# Patient Record
Sex: FEMALE | Race: WHITE | NOT HISPANIC OR LATINO | Employment: FULL TIME | URBAN - NONMETROPOLITAN AREA
[De-identification: names, ages, dates, MRNs, and addresses within clinical notes are randomized per-mention and may not be internally consistent; named-entity substitution may affect disease eponyms.]

---

## 2018-01-12 ENCOUNTER — OFFICE VISIT (OUTPATIENT)
Dept: URGENT CARE | Facility: PHYSICIAN GROUP | Age: 53
End: 2018-01-12

## 2018-01-12 VITALS
BODY MASS INDEX: 22.82 KG/M2 | OXYGEN SATURATION: 97 % | HEIGHT: 66 IN | DIASTOLIC BLOOD PRESSURE: 80 MMHG | SYSTOLIC BLOOD PRESSURE: 138 MMHG | TEMPERATURE: 99.1 F | WEIGHT: 142 LBS | HEART RATE: 63 BPM | RESPIRATION RATE: 16 BRPM

## 2018-01-12 DIAGNOSIS — R23.8 VESICULAR RASH: ICD-10-CM

## 2018-01-12 PROCEDURE — 99203 OFFICE O/P NEW LOW 30 MIN: CPT | Performed by: PHYSICIAN ASSISTANT

## 2018-01-12 RX ORDER — LOVASTATIN 20 MG/1
10 TABLET ORAL NIGHTLY
COMMUNITY
End: 2019-12-13 | Stop reason: SDUPTHER

## 2018-01-12 RX ORDER — LEVOTHYROXINE SODIUM 0.1 MG/1
100 TABLET ORAL
COMMUNITY
End: 2018-12-21 | Stop reason: SDUPTHER

## 2018-01-12 RX ORDER — LOSARTAN POTASSIUM 50 MG/1
50 TABLET ORAL DAILY
COMMUNITY
End: 2018-12-21 | Stop reason: SDUPTHER

## 2018-01-12 RX ORDER — VALACYCLOVIR HYDROCHLORIDE 1 G/1
1000 TABLET, FILM COATED ORAL 3 TIMES DAILY
Qty: 21 TAB | Refills: 1 | Status: SHIPPED | OUTPATIENT
Start: 2018-01-12 | End: 2018-01-19

## 2018-01-12 RX ORDER — CHLORAL HYDRATE 500 MG
1000 CAPSULE ORAL
COMMUNITY

## 2018-01-12 ASSESSMENT — ENCOUNTER SYMPTOMS
CHILLS: 0
MYALGIAS: 0
FEVER: 0

## 2018-01-12 NOTE — PROGRESS NOTES
Subjective:      Susi Chatman is a 52 y.o. female who presents with Knee Pain (right knee sore x30 years, blisters, redness sore)            Vesicular rash of the right knee region for the last 10 days or so. History of similar episodes in the past over the last 30 years. Reports the rash is somewhat painful and irritating at this time. No clear diagnosis in the past.      Rash   This is a recurrent problem. The current episode started 1 to 4 weeks ago. The problem has been waxing and waning since onset. Location: right leg just above the knee. The rash is characterized by blistering, pain and redness. She was exposed to nothing. Pertinent negatives include no fever. Past treatments include nothing. The treatment provided no relief.       Review of Systems   Constitutional: Negative for chills and fever.   Musculoskeletal: Negative for myalgias.   Skin: Positive for rash. Negative for itching.     Allergies:Aspirin    Current Outpatient Prescriptions Ordered in [facility]   Medication Sig Dispense Refill   • levothyroxine (SYNTHROID) 100 MCG Tab Take 100 mcg by mouth Every morning on an empty stomach.     • losartan (COZAAR) 50 MG Tab Take 50 mg by mouth every day.     • lovastatin (MEVACOR) 20 MG Tab Take 20 mg by mouth every evening.     • Omega-3 Fatty Acids (FISH OIL) 1000 MG Cap capsule Take 1,000 mg by mouth 3 times a day, with meals.     • Red Yeast Rice Extract (RED YEAST RICE PO) Take  by mouth.     • vitamin D (CHOLECALCIFEROL) 1000 UNIT Tab Take 2,000 Units by mouth every day.     • B Complex-C (SUPER B COMPLEX PO) Take  by mouth.     • valacyclovir (VALTREX) 1 GM Tab Take 1 Tab by mouth 3 times a day for 7 days. 21 Tab 1     No current Epic-ordered facility-administered medications on file.        History reviewed. No pertinent past medical history.    Social History   Substance Use Topics   • Smoking status: Never Smoker   • Smokeless tobacco: Never Used   • Alcohol use Yes      Comment: rare  "      No family status information on file.   History reviewed. No pertinent family history.       Objective:     /80   Pulse 63   Temp 37.3 °C (99.1 °F)   Resp 16   Ht 1.676 m (5' 6\")   Wt 64.4 kg (142 lb)   SpO2 97%   BMI 22.92 kg/m²      Physical Exam   Constitutional: She is oriented to person, place, and time. She appears well-developed and well-nourished. No distress.   HENT:   Head: Normocephalic and atraumatic.   Eyes: Right eye exhibits no discharge. Left eye exhibits no discharge.   Neck: Normal range of motion. Neck supple.   Cardiovascular: Normal rate.    Pulmonary/Chest: Effort normal.   Neurological: She is alert and oriented to person, place, and time.   Skin: Skin is warm and dry. She is not diaphoretic.   Erythematous, vesicular rash of the right upper leg just above the knee   Psychiatric: She has a normal mood and affect. Her behavior is normal. Judgment and thought content normal.   Nursing note and vitals reviewed.              Assessment/Plan:     1. Vesicular rash  valacyclovir (VALTREX) 1 GM Tab    Recurrent problem over the last 30 years. Recent episode started 10 days ago. Trial of valacyclovir. Follow-up with PCP       [employer] Patient Education given: Rash, shingles    Please note that this dictation was created using voice recognition software. I have made every reasonable attempt to correct obvious errors, but I expect that there are errors of grammar and possibly content that I did not discover before finalizing the note.    "

## 2018-01-12 NOTE — PATIENT INSTRUCTIONS
Rash  A rash is a change in the color or texture of the skin. There are many different types of rashes. You may have other problems that accompany your rash.  CAUSES   · Infections.  · Allergic reactions. This can include allergies to pets or foods.  · Certain medicines.  · Exposure to certain chemicals, soaps, or cosmetics.  · Heat.  · Exposure to poisonous plants.  · Tumors, both cancerous and noncancerous.  SYMPTOMS   · Redness.  · Scaly skin.  · Itchy skin.  · Dry or cracked skin.  · Bumps.  · Blisters.  · Pain.  DIAGNOSIS   Your caregiver may do a physical exam to determine what type of rash you have. A skin sample (biopsy) may be taken and examined under a microscope.  TREATMENT   Treatment depends on the type of rash you have. Your caregiver may prescribe certain medicines. For serious conditions, you may need to see a skin doctor (dermatologist).  HOME CARE INSTRUCTIONS   · Avoid the substance that caused your rash.  · Do not scratch your rash. This can cause infection.  · You may take cool baths to help stop itching.  · Only take over-the-counter or prescription medicines as directed by your caregiver.  · Keep all follow-up appointments as directed by your caregiver.  SEEK IMMEDIATE MEDICAL CARE IF:  · You have increasing pain, swelling, or redness.  · You have a fever.  · You have new or severe symptoms.  · You have body aches, diarrhea, or vomiting.  · Your rash is not better after 3 days.  MAKE SURE YOU:  · Understand these instructions.  · Will watch your condition.  · Will get help right away if you are not doing well or get worse.     This information is not intended to replace advice given to you by your health care provider. Make sure you discuss any questions you have with your health care provider.     Document Released: 12/08/2003 Document Revised: 01/08/2016 Document Reviewed: 10/01/2012  [employer] Interactive Patient Education ©2016 [employer]      Shingles  Shingles, which is also known as  herpes zoster, is an infection that causes a painful skin rash and fluid-filled blisters. Shingles is not related to genital herpes, which is a sexually transmitted infection.     Shingles only develops in people who:  · Have had chickenpox.  · Have received the chickenpox vaccine. (This is rare.)  CAUSES  Shingles is caused by varicella-zoster virus (VZV). This is the same virus that causes chickenpox. After exposure to VZV, the virus stays in the body in an inactive (dormant) state. Shingles develops if the virus reactivates. This can happen many years after the initial exposure to VZV. It is not known what causes this virus to reactivate.  RISK FACTORS  People who have had chickenpox or received the chickenpox vaccine are at risk for shingles. Infection is more common in people who:  · Are older than age 50.  · Have a weakened defense (immune) system, such as those with HIV, AIDS, or cancer.  · Are taking medicines that weaken the immune system, such as transplant medicines.  · Are under great stress.  SYMPTOMS  Early symptoms of this condition include itching, tingling, and pain in an area on your skin. Pain may be described as burning, stabbing, or throbbing.  A few days or weeks after symptoms start, a painful red rash appears, usually on one side of the body in a bandlike or beltlike pattern. The rash eventually turns into fluid-filled blisters that break open, scab over, and dry up in about 2-3 weeks.  At any time during the infection, you may also develop:  · A fever.  · Chills.  · A headache.  · An upset stomach.  DIAGNOSIS  This condition is diagnosed with a skin exam. Sometimes, skin or fluid samples are taken from the blisters before a diagnosis is made. These samples are examined under a microscope or sent to a lab for testing.  TREATMENT  There is no specific cure for this condition. Your health care provider will probably prescribe medicines to help you manage pain, recover more quickly, and avoid  long-term problems. Medicines may include:  · Antiviral drugs.  · Anti-inflammatory drugs.  · Pain medicines.  If the area involved is on your face, you may be referred to a specialist, such as an eye doctor (ophthalmologist) or an ear, nose, and throat (ENT) doctor to help you avoid eye problems, chronic pain, or disability.  HOME CARE INSTRUCTIONS  Medicines  · Take medicines only as directed by your health care provider.  · Apply an anti-itch or numbing cream to the affected area as directed by your health care provider.  Blister and Rash Care  · Take a cool bath or apply cool compresses to the area of the rash or blisters as directed by your health care provider. This may help with pain and itching.  · Keep your rash covered with a loose bandage (dressing). Wear loose-fitting clothing to help ease the pain of material rubbing against the rash.  · Keep your rash and blisters clean with mild soap and cool water or as directed by your health care provider.  · Check your rash every day for signs of infection. These include redness, swelling, and pain that lasts or increases.  · Do not pick your blisters.  · Do not scratch your rash.  General Instructions  · Rest as directed by your health care provider.  · Keep all follow-up visits as directed by your health care provider. This is important.  · Until your blisters scab over, your infection can cause chickenpox in people who have never had it or been vaccinated against it. To prevent this from happening, avoid contact with other people, especially:  ¨ Babies.  ¨ Pregnant women.  ¨ Children who have eczema.  ¨ Elderly people who have transplants.  ¨ People who have chronic illnesses, such as leukemia or AIDS.  SEEK MEDICAL CARE IF:  · Your pain is not relieved with prescribed medicines.  · Your pain does not get better after the rash heals.  · Your rash looks infected. Signs of infection include redness, swelling, and pain that lasts or increases.  SEEK IMMEDIATE  MEDICAL CARE IF:  · The rash is on your face or nose.  · You have facial pain, pain around your eye area, or loss of feeling on one side of your face.  · You have ear pain or you have ringing in your ear.  · You have loss of taste.  · Your condition gets worse.     This information is not intended to replace advice given to you by your health care provider. Make sure you discuss any questions you have with your health care provider.     Document Released: 12/18/2006 Document Revised: 01/08/2016 Document Reviewed: 10/29/2015  [employer] Interactive Patient Education ©2016 [employer]

## 2018-11-19 ENCOUNTER — TELEPHONE (OUTPATIENT)
Dept: SCHEDULING | Facility: IMAGING CENTER | Age: 53
End: 2018-11-19

## 2018-12-21 ENCOUNTER — OFFICE VISIT (OUTPATIENT)
Dept: MEDICAL GROUP | Facility: MEDICAL CENTER | Age: 53
End: 2018-12-21
Payer: COMMERCIAL

## 2018-12-21 VITALS
DIASTOLIC BLOOD PRESSURE: 94 MMHG | SYSTOLIC BLOOD PRESSURE: 136 MMHG | TEMPERATURE: 98.8 F | HEART RATE: 66 BPM | OXYGEN SATURATION: 97 % | BODY MASS INDEX: 25.78 KG/M2 | RESPIRATION RATE: 16 BRPM | WEIGHT: 160.4 LBS | HEIGHT: 66 IN

## 2018-12-21 DIAGNOSIS — E03.9 ACQUIRED HYPOTHYROIDISM: ICD-10-CM

## 2018-12-21 DIAGNOSIS — N18.30 STAGE 3 CHRONIC KIDNEY DISEASE (HCC): ICD-10-CM

## 2018-12-21 DIAGNOSIS — E78.2 MIXED HYPERLIPIDEMIA: ICD-10-CM

## 2018-12-21 DIAGNOSIS — Z78.0 POST-MENOPAUSE: ICD-10-CM

## 2018-12-21 DIAGNOSIS — I10 ESSENTIAL HYPERTENSION: ICD-10-CM

## 2018-12-21 DIAGNOSIS — Z23 NEED FOR INFLUENZA VACCINATION: ICD-10-CM

## 2018-12-21 DIAGNOSIS — Z12.39 SCREENING FOR BREAST CANCER: ICD-10-CM

## 2018-12-21 PROCEDURE — 99204 OFFICE O/P NEW MOD 45 MIN: CPT | Mod: 25 | Performed by: PHYSICIAN ASSISTANT

## 2018-12-21 PROCEDURE — 90471 IMMUNIZATION ADMIN: CPT | Performed by: PHYSICIAN ASSISTANT

## 2018-12-21 PROCEDURE — 90686 IIV4 VACC NO PRSV 0.5 ML IM: CPT | Performed by: PHYSICIAN ASSISTANT

## 2018-12-21 RX ORDER — LEVOTHYROXINE SODIUM 0.1 MG/1
100 TABLET ORAL
Qty: 90 TAB | Refills: 3 | Status: SHIPPED | OUTPATIENT
Start: 2018-12-21 | End: 2019-03-21

## 2018-12-21 RX ORDER — LOSARTAN POTASSIUM 50 MG/1
50 TABLET ORAL DAILY
Qty: 90 TAB | Refills: 3 | Status: SHIPPED | OUTPATIENT
Start: 2018-12-21 | End: 2019-05-28

## 2018-12-21 RX ORDER — LOVASTATIN 10 MG/1
10 TABLET ORAL DAILY
Qty: 90 TAB | Refills: 3 | Status: SHIPPED | OUTPATIENT
Start: 2018-12-21 | End: 2019-03-21

## 2018-12-21 RX ORDER — ESTRADIOL 0.5 MG/1
0.5 TABLET ORAL DAILY
Qty: 90 TAB | Refills: 3 | Status: SHIPPED | OUTPATIENT
Start: 2018-12-21 | End: 2019-05-28

## 2018-12-21 ASSESSMENT — PATIENT HEALTH QUESTIONNAIRE - PHQ9: CLINICAL INTERPRETATION OF PHQ2 SCORE: 0

## 2018-12-21 NOTE — ASSESSMENT & PLAN NOTE
On medication since 2008. No headache, dizziness, chest pain, leg swelling. No history of heart attack or stroke.

## 2018-12-21 NOTE — ASSESSMENT & PLAN NOTE
No nodules, surgeries, cancers. [facility]. Off for a few months.

## 2018-12-21 NOTE — ASSESSMENT & PLAN NOTE
Has been on statin since 2008. No hx of heart attack or stroke. Due for labs. Ran out of statin about 3 months ago.

## 2018-12-21 NOTE — ASSESSMENT & PLAN NOTE
Partial hysterectomy 2004, due to heavy bleeding, took out uterus, left the ovaries. Started having menopause symptoms 9 years ago. Has been on progesterone and estradiol in the past, on for about 5-6 years , off for a year. Would like to talk about restarting. Currently has hot flashes. Having some vaginal dryness and pain with intercourse. Moods are really bad. No personal history or 1st degree relative with breast cancer. Mom  from Parkinson's disease in . No personal history of blood clot or stroke. Chewed tobacco for 18 years. Never smoker.

## 2018-12-21 NOTE — PROGRESS NOTES
Chief Complaint   Patient presents with   • Establish Care   • Medication Refill     lovastatin, levothyroxine off them for 6 weeks       HISTORY OF THE PRESENT ILLNESS: This is a 53 y.o. female new patient to our practice. This pleasant patient is here today to establish care. . 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 Lives in [place], [place].  Needs refills on medications. Due for preventative care.     Stage 3 chronic kidney disease (HCC)  Goes to [facility] q 6 months    Essential hypertension  On medication since . No headache, dizziness, chest pain, leg swelling. No history of heart attack or stroke.    Post-menopause  Partial hysterectomy 2004, due to heavy bleeding, took out uterus, left the ovaries. Started having menopause symptoms 9 years ago. Has been on progesterone and estradiol in the past, on for about 5-6 years , off for a year. Would like to talk about restarting. Currently has hot flashes. Having some vaginal dryness and pain with intercourse. Moods are really bad. No personal history or 1st degree relative with breast cancer. Mom  from Parkinson's disease in . No personal history of blood clot or stroke. Chewed tobacco for 18 years. Never smoker.     Mixed hyperlipidemia  Has been on statin since . No hx of heart attack or stroke. Due for labs. Ran out of statin about 3 months ago.    Acquired hypothyroidism  No nodules, surgeries, cancers. Was on the 100mcg for few years. Off for a few months.       Past Medical History:   Diagnosis Date   • Hyperlipidemia    • Hypertension    • Kidney disease, chronic, stage III (moderate, EGFR 30-59 ml/min) ([facility])    • Thyroid disease        Past Surgical History:   Procedure Laterality Date   • PRIMARY C SECTION     • ABDOMINAL SUBTOTAL HYSTERECTOMY     • ARTHROSCOPY, KNEE Bilateral 2002    lateral release   • TENDON RELEASE FOOT         Family Status   Relation Status   • Mo            • Fa Alive   • MGMo       Family History   Problem Relation Age of Onset   • Other Mother         Parkinson's   • Kidney Disease Father    • Hyperlipidemia Father    • Hypertension Father    • Kidney Disease Maternal Grandmother        Social History   Substance Use Topics   • Smoking status: Former Smoker     Quit date: 2000   • Smokeless tobacco: Never Used   • Alcohol use 0.6 oz/week     1 Glasses of wine per week      Comment: rare       Allergies: Aspirin    Current Outpatient Prescriptions Ordered in [facility]   Medication Sig Dispense Refill   • progesterone (PROMETRIUM) 100 MG Cap Take 1 Cap by mouth every day for 90 days. 90 Cap 3   • estradiol (ESTRACE) 0.5 MG tablet Take 1 Tab by mouth every day for 90 days. 90 Tab 3   • levothyroxine (SYNTHROID) 100 MCG Tab Take 1 Tab by mouth Every morning on an empty stomach for 90 days. 90 Tab 3   • losartan (COZAAR) 50 MG Tab Take 1 Tab by mouth every day for 90 days. 90 Tab 3   • lovastatin (MEVACOR) 10 MG tablet Take 1 Tab by mouth every day for 90 days. 90 Tab 3   • lovastatin (MEVACOR) 20 MG Tab Take 10 mg by mouth every evening.     • Omega-3 Fatty Acids (FISH OIL) 1000 MG Cap capsule Take 1,000 mg by mouth 3 times a day, with meals.     • Red Yeast Rice Extract (RED YEAST RICE PO) Take  by mouth.     • vitamin D (CHOLECALCIFEROL) 1000 UNIT Tab Take 2,000 Units by mouth every day.     • B Complex-C (SUPER B COMPLEX PO) Take  by mouth.       No current Epic-ordered facility-administered medications on file.        Review of Systems   Constitutional: Negative for fever, chills, weight loss and malaise/fatigue.   HENT: Negative for ear pain, nosebleeds, congestion, sore throat and neck pain.    Eyes: Negative for blurred vision.   Respiratory: Negative for cough, sputum production, shortness of breath and wheezing.    Cardiovascular: Negative for chest pain, palpitations, orthopnea and leg swelling.   Gastrointestinal: Negative for heartburn, nausea, vomiting and abdominal  "pain.   Genitourinary: Negative for dysuria, urgency and frequency.   Musculoskeletal: Negative for myalgias, back pain and joint pain.   Skin: Negative for rash and itching.   Neurological: Negative for dizziness, tingling, tremors, sensory change, focal weakness and headaches.   Endo/Heme/Allergies: Does not bruise/bleed easily.   Psychiatric/Behavioral: Negative for depression, anxiety, or memory loss.     All other systems reviewed and are negative except as in HPI.    Exam: Blood pressure 136/94, pulse 66, temperature 37.1 °C (98.8 °F), temperature source Temporal, resp. rate 16, height 1.676 m (5' 6\"), weight 72.8 kg (160 lb 6.4 oz), SpO2 97 %.  General: Normal appearing. No distress.  Neck: Supple without JVD or bruit. Thyroid is not enlarged.  Pulmonary: Clear to ausculation.  Normal effort. No rales, ronchi, or wheezing.  Cardiovascular: Regular rate and rhythm without murmur. Carotid and radial pulses are intact and equal bilaterally.  Neurologic: Grossly nonfocal  Lymph: No cervical, supraclavicular lymph nodes are palpable  Skin: Warm and dry.  No obvious lesions.  Musculoskeletal: Normal gait. No extremity cyanosis, clubbing, or edema.  Psych: Normal mood and affect. Alert and oriented x3. Judgment and insight is normal.    Assessment/Plan  1. Essential hypertension  losartan (COZAAR) 50 MG Tab    COMP METABOLIC PANEL   2. Mixed hyperlipidemia  lovastatin (MEVACOR) 10 MG tablet    Lipid Profile   3. Stage 3 chronic kidney disease (HCC)     4. Acquired hypothyroidism  levothyroxine (SYNTHROID) 100 MCG Tab    TSH WITH REFLEX TO FT4   5. Post-menopause  progesterone (PROMETRIUM) 100 MG Cap    estradiol (ESTRACE) 0.5 MG tablet   6. Need for influenza vaccination  Flu Quad Inj >3 Year Pre-Filled PF   7. Screening for breast cancer  MA-SCREEN MAMMO W/CAD-BILAT   labs, imaging as ordered. F/u 6 months for well woman exam  "

## 2019-01-25 ENCOUNTER — HOSPITAL ENCOUNTER (OUTPATIENT)
Dept: RADIOLOGY | Facility: MEDICAL CENTER | Age: 54
End: 2019-01-25
Attending: PHYSICIAN ASSISTANT
Payer: COMMERCIAL

## 2019-01-25 DIAGNOSIS — Z12.39 SCREENING FOR BREAST CANCER: ICD-10-CM

## 2019-01-25 PROCEDURE — 77067 SCR MAMMO BI INCL CAD: CPT

## 2019-01-28 ENCOUNTER — HOSPITAL ENCOUNTER (OUTPATIENT)
Dept: RADIOLOGY | Facility: MEDICAL CENTER | Age: 54
End: 2019-01-28

## 2019-03-22 ENCOUNTER — HOSPITAL ENCOUNTER (OUTPATIENT)
Dept: LAB | Facility: MEDICAL CENTER | Age: 54
End: 2019-03-22
Attending: PHYSICIAN ASSISTANT
Payer: COMMERCIAL

## 2019-03-22 ENCOUNTER — HOSPITAL ENCOUNTER (OUTPATIENT)
Dept: LAB | Facility: MEDICAL CENTER | Age: 54
End: 2019-03-22
Attending: INTERNAL MEDICINE
Payer: COMMERCIAL

## 2019-03-22 DIAGNOSIS — E03.9 ACQUIRED HYPOTHYROIDISM: ICD-10-CM

## 2019-03-22 DIAGNOSIS — I10 ESSENTIAL HYPERTENSION: ICD-10-CM

## 2019-03-22 DIAGNOSIS — E78.2 MIXED HYPERLIPIDEMIA: ICD-10-CM

## 2019-03-22 LAB
ALBUMIN SERPL BCP-MCNC: 4.3 G/DL (ref 3.2–4.9)
ALBUMIN SERPL BCP-MCNC: 4.7 G/DL (ref 3.2–4.9)
ALBUMIN/GLOB SERPL: 1.7 G/DL
ALP SERPL-CCNC: 46 U/L (ref 30–99)
ALT SERPL-CCNC: 15 U/L (ref 2–50)
ANION GAP SERPL CALC-SCNC: 5 MMOL/L (ref 0–11.9)
APPEARANCE UR: CLEAR
AST SERPL-CCNC: 20 U/L (ref 12–45)
BASOPHILS # BLD AUTO: 0.5 % (ref 0–1.8)
BASOPHILS # BLD: 0.03 K/UL (ref 0–0.12)
BILIRUB SERPL-MCNC: 0.5 MG/DL (ref 0.1–1.5)
BILIRUB UR QL STRIP.AUTO: NEGATIVE
BUN SERPL-MCNC: 24 MG/DL (ref 8–22)
BUN SERPL-MCNC: 24 MG/DL (ref 8–22)
CALCIUM SERPL-MCNC: 9.4 MG/DL (ref 8.5–10.5)
CALCIUM SERPL-MCNC: 9.4 MG/DL (ref 8.5–10.5)
CHLORIDE SERPL-SCNC: 108 MMOL/L (ref 96–112)
CHLORIDE SERPL-SCNC: 109 MMOL/L (ref 96–112)
CHOLEST SERPL-MCNC: 196 MG/DL (ref 100–199)
CO2 SERPL-SCNC: 29 MMOL/L (ref 20–33)
CO2 SERPL-SCNC: 30 MMOL/L (ref 20–33)
COLOR UR: YELLOW
CREAT SERPL-MCNC: 1.31 MG/DL (ref 0.5–1.4)
CREAT SERPL-MCNC: 1.38 MG/DL (ref 0.5–1.4)
CREAT UR-MCNC: 84.3 MG/DL
EOSINOPHIL # BLD AUTO: 0.27 K/UL (ref 0–0.51)
EOSINOPHIL NFR BLD: 4.4 % (ref 0–6.9)
ERYTHROCYTE [DISTWIDTH] IN BLOOD BY AUTOMATED COUNT: 39.1 FL (ref 35.9–50)
GLOBULIN SER CALC-MCNC: 2.5 G/DL (ref 1.9–3.5)
GLUCOSE SERPL-MCNC: 89 MG/DL (ref 65–99)
GLUCOSE SERPL-MCNC: 90 MG/DL (ref 65–99)
GLUCOSE UR STRIP.AUTO-MCNC: NEGATIVE MG/DL
HCT VFR BLD AUTO: 44.2 % (ref 37–47)
HDLC SERPL-MCNC: 70 MG/DL
HGB BLD-MCNC: 14.7 G/DL (ref 12–16)
IMM GRANULOCYTES # BLD AUTO: 0 K/UL (ref 0–0.11)
IMM GRANULOCYTES NFR BLD AUTO: 0 % (ref 0–0.9)
KETONES UR STRIP.AUTO-MCNC: NEGATIVE MG/DL
LDLC SERPL CALC-MCNC: 106 MG/DL
LEUKOCYTE ESTERASE UR QL STRIP.AUTO: NEGATIVE
LYMPHOCYTES # BLD AUTO: 2.71 K/UL (ref 1–4.8)
LYMPHOCYTES NFR BLD: 43.9 % (ref 22–41)
MAGNESIUM SERPL-MCNC: 2 MG/DL (ref 1.5–2.5)
MCH RBC QN AUTO: 30.9 PG (ref 27–33)
MCHC RBC AUTO-ENTMCNC: 33.3 G/DL (ref 33.6–35)
MCV RBC AUTO: 93.1 FL (ref 81.4–97.8)
MICRO URNS: NORMAL
MONOCYTES # BLD AUTO: 0.39 K/UL (ref 0–0.85)
MONOCYTES NFR BLD AUTO: 6.3 % (ref 0–13.4)
NEUTROPHILS # BLD AUTO: 2.77 K/UL (ref 2–7.15)
NEUTROPHILS NFR BLD: 44.9 % (ref 44–72)
NITRITE UR QL STRIP.AUTO: NEGATIVE
NRBC # BLD AUTO: 0 K/UL
NRBC BLD-RTO: 0 /100 WBC
PH UR STRIP.AUTO: 7 [PH]
PHOSPHATE SERPL-MCNC: 2.9 MG/DL (ref 2.5–4.5)
PLATELET # BLD AUTO: 204 K/UL (ref 164–446)
PMV BLD AUTO: 11.3 FL (ref 9–12.9)
POTASSIUM SERPL-SCNC: 4.4 MMOL/L (ref 3.6–5.5)
POTASSIUM SERPL-SCNC: 4.5 MMOL/L (ref 3.6–5.5)
PROT SERPL-MCNC: 6.8 G/DL (ref 6–8.2)
PROT UR QL STRIP: NEGATIVE MG/DL
PROT UR-MCNC: 5.4 MG/DL (ref 0–15)
PROT/CREAT UR: 64 MG/G (ref 10–107)
RBC # BLD AUTO: 4.75 M/UL (ref 4.2–5.4)
RBC UR QL AUTO: NEGATIVE
SODIUM SERPL-SCNC: 144 MMOL/L (ref 135–145)
SODIUM SERPL-SCNC: 144 MMOL/L (ref 135–145)
SP GR UR STRIP.AUTO: 1.01
T4 FREE SERPL-MCNC: 0.89 NG/DL (ref 0.53–1.43)
TRIGL SERPL-MCNC: 99 MG/DL (ref 0–149)
TSH SERPL DL<=0.005 MIU/L-ACNC: 8.43 UIU/ML (ref 0.38–5.33)
URATE SERPL-MCNC: 6.8 MG/DL (ref 1.9–8.2)
UROBILINOGEN UR STRIP.AUTO-MCNC: 0.2 MG/DL
WBC # BLD AUTO: 6.2 K/UL (ref 4.8–10.8)

## 2019-03-22 PROCEDURE — 80061 LIPID PANEL: CPT

## 2019-03-22 PROCEDURE — 82570 ASSAY OF URINE CREATININE: CPT

## 2019-03-22 PROCEDURE — 36415 COLL VENOUS BLD VENIPUNCTURE: CPT

## 2019-03-22 PROCEDURE — 84439 ASSAY OF FREE THYROXINE: CPT

## 2019-03-22 PROCEDURE — 84156 ASSAY OF PROTEIN URINE: CPT

## 2019-03-22 PROCEDURE — 81003 URINALYSIS AUTO W/O SCOPE: CPT

## 2019-03-22 PROCEDURE — 84550 ASSAY OF BLOOD/URIC ACID: CPT

## 2019-03-22 PROCEDURE — 84443 ASSAY THYROID STIM HORMONE: CPT

## 2019-03-22 PROCEDURE — 85025 COMPLETE CBC W/AUTO DIFF WBC: CPT

## 2019-03-22 PROCEDURE — 80053 COMPREHEN METABOLIC PANEL: CPT

## 2019-03-22 PROCEDURE — 80069 RENAL FUNCTION PANEL: CPT

## 2019-03-22 PROCEDURE — 83735 ASSAY OF MAGNESIUM: CPT

## 2019-04-01 ENCOUNTER — PATIENT MESSAGE (OUTPATIENT)
Dept: MEDICAL GROUP | Facility: MEDICAL CENTER | Age: 54
End: 2019-04-01

## 2019-04-01 DIAGNOSIS — E03.9 ACQUIRED HYPOTHYROIDISM: ICD-10-CM

## 2019-04-01 RX ORDER — LEVOTHYROXINE SODIUM 0.12 MG/1
125 TABLET ORAL
Qty: 30 TAB | Refills: 2 | Status: SHIPPED | OUTPATIENT
Start: 2019-04-01 | End: 2019-05-28

## 2019-05-15 ENCOUNTER — PATIENT MESSAGE (OUTPATIENT)
Dept: MEDICAL GROUP | Facility: MEDICAL CENTER | Age: 54
End: 2019-05-15

## 2019-05-17 ENCOUNTER — HOSPITAL ENCOUNTER (OUTPATIENT)
Dept: LAB | Facility: MEDICAL CENTER | Age: 54
End: 2019-05-17
Attending: PHYSICIAN ASSISTANT
Payer: COMMERCIAL

## 2019-05-17 DIAGNOSIS — E03.9 ACQUIRED HYPOTHYROIDISM: ICD-10-CM

## 2019-05-17 LAB
T4 FREE SERPL-MCNC: 1.44 NG/DL (ref 0.53–1.43)
TSH SERPL DL<=0.005 MIU/L-ACNC: 0.12 UIU/ML (ref 0.38–5.33)

## 2019-05-17 PROCEDURE — 36415 COLL VENOUS BLD VENIPUNCTURE: CPT

## 2019-05-17 PROCEDURE — 84439 ASSAY OF FREE THYROXINE: CPT

## 2019-05-17 PROCEDURE — 84443 ASSAY THYROID STIM HORMONE: CPT

## 2019-05-28 ENCOUNTER — OFFICE VISIT (OUTPATIENT)
Dept: MEDICAL GROUP | Facility: MEDICAL CENTER | Age: 54
End: 2019-05-28
Payer: COMMERCIAL

## 2019-05-28 ENCOUNTER — HOSPITAL ENCOUNTER (OUTPATIENT)
Facility: MEDICAL CENTER | Age: 54
End: 2019-05-28
Attending: PHYSICIAN ASSISTANT
Payer: COMMERCIAL

## 2019-05-28 VITALS
SYSTOLIC BLOOD PRESSURE: 118 MMHG | DIASTOLIC BLOOD PRESSURE: 70 MMHG | RESPIRATION RATE: 14 BRPM | HEART RATE: 70 BPM | WEIGHT: 167.8 LBS | OXYGEN SATURATION: 97 % | TEMPERATURE: 98.8 F | BODY MASS INDEX: 26.97 KG/M2 | HEIGHT: 66 IN

## 2019-05-28 DIAGNOSIS — Z12.4 SCREENING FOR CERVICAL CANCER: ICD-10-CM

## 2019-05-28 DIAGNOSIS — I10 ESSENTIAL HYPERTENSION: ICD-10-CM

## 2019-05-28 DIAGNOSIS — N18.30 STAGE 3 CHRONIC KIDNEY DISEASE (HCC): ICD-10-CM

## 2019-05-28 DIAGNOSIS — Z78.0 POST-MENOPAUSE: ICD-10-CM

## 2019-05-28 DIAGNOSIS — E78.2 MIXED HYPERLIPIDEMIA: ICD-10-CM

## 2019-05-28 DIAGNOSIS — Z00.00 WELLNESS EXAMINATION: ICD-10-CM

## 2019-05-28 DIAGNOSIS — Z12.11 SCREENING FOR COLON CANCER: ICD-10-CM

## 2019-05-28 DIAGNOSIS — E03.9 ACQUIRED HYPOTHYROIDISM: ICD-10-CM

## 2019-05-28 DIAGNOSIS — R23.2 HOT FLASHES: ICD-10-CM

## 2019-05-28 DIAGNOSIS — N39.46 MIXED STRESS AND URGE URINARY INCONTINENCE: ICD-10-CM

## 2019-05-28 PROCEDURE — 87624 HPV HI-RISK TYP POOLED RSLT: CPT

## 2019-05-28 PROCEDURE — 99396 PREV VISIT EST AGE 40-64: CPT | Performed by: PHYSICIAN ASSISTANT

## 2019-05-28 PROCEDURE — 88175 CYTOPATH C/V AUTO FLUID REDO: CPT

## 2019-05-28 RX ORDER — LEVOTHYROXINE SODIUM 112 UG/1
112 TABLET ORAL
Qty: 30 TAB | Refills: 3 | Status: SHIPPED | OUTPATIENT
Start: 2019-05-28 | End: 2019-09-28 | Stop reason: SDUPTHER

## 2019-05-28 RX ORDER — OXYBUTYNIN CHLORIDE 10 MG/1
10 TABLET, EXTENDED RELEASE ORAL DAILY
Qty: 30 TAB | Refills: 6 | Status: SHIPPED | OUTPATIENT
Start: 2019-05-28 | End: 2019-12-13 | Stop reason: SDUPTHER

## 2019-05-28 ASSESSMENT — PATIENT HEALTH QUESTIONNAIRE - PHQ9: CLINICAL INTERPRETATION OF PHQ2 SCORE: 0

## 2019-05-28 NOTE — PROGRESS NOTES
SUBJECTIVE: 54 y.o. female for annual routine pap and checkup.  Chief Complaint   Patient presents with   • Gynecologic Exam     Problem List Items Addressed This Visit     Essential hypertension     Chronic, stable, taking med as prescribed, no dizziness, does feel fatigued         Mixed hyperlipidemia     Chronic, stable, taking statin as prescribed         Stage 3 chronic kidney disease (HCC)     Chronic, stable, managed with nephrology         Acquired hypothyroidism     Still feeling lethargic on the 125mcg even though TSH is a little too low. Maybe having depression/stress related to daughter with mental health issues and father who is going through divorce         Relevant Medications    levothyroxine (SYNTHROID) 112 MCG Tab    Post-menopause     Hot flashes are better but still bothersome, would like to try increase dose in prometrium         Mixed stress and urge urinary incontinence     Worsening, at least a year, would like to try medication, hasn't been on medication for this previously         Relevant Medications    oxybutynin SR (DITROPAN-XL) 10 MG CR tablet      Other Visit Diagnoses     Wellness examination        Screening for cervical cancer        Relevant Orders    THINPREP PAP WITH HPV    Screening for colon cancer        Hot flashes        Relevant Medications    progesterone (PROMETRIUM) 200 MG capsule          , second birth was c section  Last Pap: 4-5  Contraception: menopause  Current partner: monogamous,      LMP: No LMP recorded. Patient has had a hysterectomy.    Allergies: Aspirin     ROS   moderate menopause symptoms of hot flashes, night sweats, sleep disruption, mood changes, vaginal dryness.   No pelvic pain, or dyspareunia. No vaginal discharge   No breast tenderness, mass, nipple discharge, changes in size or contour, or abnormal cyclic discomfort.  No abdominal pain, change in bowel habits, black or bloody stools.    No unusual headaches, no visual changes.   No  "prolonged cough. No dyspnea or chest pain on exertion.    No skin lesions, concerns.     Preventive Care:  Mammogram: up to date  Colonoscopy due      Current Outpatient Prescriptions   Medication Sig Dispense Refill   • levothyroxine (SYNTHROID) 112 MCG Tab Take 1 Tab by mouth Every morning on an empty stomach. 30 Tab 3   • progesterone (PROMETRIUM) 200 MG capsule Take 1 Cap by mouth every day. 30 Cap 11   • oxybutynin SR (DITROPAN-XL) 10 MG CR tablet Take 1 Tab by mouth every day. 30 Tab 6   • estradiol (ESTRACE) 0.5 MG tablet Take 1 Tab by mouth every day for 90 days. 90 Tab 3   • losartan (COZAAR) 50 MG Tab Take 1 Tab by mouth every day for 90 days. 90 Tab 3   • lovastatin (MEVACOR) 20 MG Tab Take 10 mg by mouth every evening.     • Omega-3 Fatty Acids (FISH OIL) 1000 MG Cap capsule Take 1,000 mg by mouth 3 times a day, with meals.     • Red Yeast Rice Extract (RED YEAST RICE PO) Take  by mouth.     • vitamin D (CHOLECALCIFEROL) 1000 UNIT Tab Take 2,000 Units by mouth every day.     • B Complex-C (SUPER B COMPLEX PO) Take  by mouth.       No current facility-administered medications for this visit.      She  has a past medical history of Hyperlipidemia; Hypertension; Kidney disease, chronic, stage III (moderate, EGFR 30-59 ml/min) ([facility]) (2011); and Thyroid disease.  She  has a past surgical history that includes abdominal subtotal hysterectomy; primary c section (1996); arthroscopy, knee (Bilateral, 2000, 2002); and tendon release foot.     Family History:   Family History   Problem Relation Age of Onset   • Other Mother         Parkinson's   • Kidney Disease Father    • Hyperlipidemia Father    • Hypertension Father    • Kidney Disease Maternal Grandmother       OBJECTIVE:   /70 (BP Location: Left arm, Patient Position: Sitting, BP Cuff Size: Adult)   Pulse 70   Temp 37.1 °C (98.8 °F) (Temporal)   Resp 14   Ht 1.676 m (5' 6\")   Wt 76.1 kg (167 lb 12.8 oz)   SpO2 97%   BMI 27.08 kg/m²   Body mass " index is 27.08 kg/m².    HEAD AND NECK:  Ears normal.  Throat, oral cavity and tongue normal.  Neck supple. No adenopathy or masses in the neck or supraclavicular regions. No thyromegaly. CV: rrr, no leg edema. ABDOMEN:  Soft without tenderness, guarding, mass or organomegaly.   EXTREMITIES:  Extremities are normal.  SKIN:  No rashes or suspicious skin lesions noted.     Breast Exam: Performed with instruction during examination. No axillary lymphadenopathy, no skin changes, no dominant masses. No nipple retraction  Pelvic Exam - Sure Path Pap obtained and specimen sent to lab. Normal external genitalia with no lesions. Normal vaginal mucosa with normal rugation and no discharge. Cervix with no visible lesions. No cervical motion tenderness. No adnexal tenderness or enlargement appreciated.      <ASSESSMENT>  1. Wellness examination     2. Screening for cervical cancer  THINPREP PAP WITH HPV   3. Acquired hypothyroidism  levothyroxine (SYNTHROID) 112 MCG Tab   4. Screening for colon cancer     5. Hot flashes  progesterone (PROMETRIUM) 200 MG capsule   6. Mixed stress and urge urinary incontinence  oxybutynin SR (DITROPAN-XL) 10 MG CR tablet   7. Essential hypertension     8. Mixed hyperlipidemia     9. Stage 3 chronic kidney disease (HCC)     10. Post-menopause       F/u yearly and as needed

## 2019-05-28 NOTE — ASSESSMENT & PLAN NOTE
Still feeling lethargic on the 125mcg even though TSH is a little too low. Maybe having depression/stress related to daughter with mental health issues and father who is going through divorce

## 2019-05-28 NOTE — ASSESSMENT & PLAN NOTE
Worsening, at least a year, would like to try medication, hasn't been on medication for this previously

## 2019-05-29 DIAGNOSIS — Z12.4 SCREENING FOR CERVICAL CANCER: ICD-10-CM

## 2019-05-30 LAB
CYTOLOGY REG CYTOL: NORMAL
HPV HR 12 DNA CVX QL NAA+PROBE: NEGATIVE
HPV16 DNA SPEC QL NAA+PROBE: NEGATIVE
HPV18 DNA SPEC QL NAA+PROBE: NEGATIVE
SPECIMEN SOURCE: NORMAL

## 2019-09-28 DIAGNOSIS — E03.9 ACQUIRED HYPOTHYROIDISM: ICD-10-CM

## 2019-09-30 RX ORDER — LEVOTHYROXINE SODIUM 112 UG/1
TABLET ORAL
Qty: 90 TAB | Refills: 2 | Status: SHIPPED | OUTPATIENT
Start: 2019-09-30

## 2019-11-14 ENCOUNTER — TELEPHONE (OUTPATIENT)
Dept: MEDICAL GROUP | Facility: MEDICAL CENTER | Age: 54
End: 2019-11-14

## 2019-11-14 DIAGNOSIS — N18.30 STAGE 3 CHRONIC KIDNEY DISEASE (HCC): ICD-10-CM

## 2019-11-14 DIAGNOSIS — E03.9 ACQUIRED HYPOTHYROIDISM: ICD-10-CM

## 2019-11-22 ENCOUNTER — HOSPITAL ENCOUNTER (OUTPATIENT)
Dept: LAB | Facility: MEDICAL CENTER | Age: 54
End: 2019-11-22
Attending: INTERNAL MEDICINE
Payer: COMMERCIAL

## 2019-11-22 ENCOUNTER — HOSPITAL ENCOUNTER (OUTPATIENT)
Dept: LAB | Facility: MEDICAL CENTER | Age: 54
End: 2019-11-22
Attending: PHYSICIAN ASSISTANT
Payer: COMMERCIAL

## 2019-11-22 DIAGNOSIS — N18.30 STAGE 3 CHRONIC KIDNEY DISEASE (HCC): ICD-10-CM

## 2019-11-22 DIAGNOSIS — E03.9 ACQUIRED HYPOTHYROIDISM: ICD-10-CM

## 2019-11-22 LAB
25(OH)D3 SERPL-MCNC: 44 NG/ML (ref 30–100)
ALBUMIN SERPL BCP-MCNC: 4.3 G/DL (ref 3.2–4.9)
ALBUMIN SERPL BCP-MCNC: 4.7 G/DL (ref 3.2–4.9)
ALBUMIN/GLOB SERPL: 1.4 G/DL
ALP SERPL-CCNC: 48 U/L (ref 30–99)
ALT SERPL-CCNC: 18 U/L (ref 2–50)
ANION GAP SERPL CALC-SCNC: 8 MMOL/L (ref 0–11.9)
APPEARANCE UR: CLEAR
AST SERPL-CCNC: 20 U/L (ref 12–45)
BASOPHILS # BLD AUTO: 0.4 % (ref 0–1.8)
BASOPHILS # BLD: 0.03 K/UL (ref 0–0.12)
BILIRUB SERPL-MCNC: 0.7 MG/DL (ref 0.1–1.5)
BILIRUB UR QL STRIP.AUTO: NEGATIVE
BUN SERPL-MCNC: 23 MG/DL (ref 8–22)
BUN SERPL-MCNC: 25 MG/DL (ref 8–22)
CALCIUM SERPL-MCNC: 9.4 MG/DL (ref 8.5–10.5)
CALCIUM SERPL-MCNC: 9.6 MG/DL (ref 8.5–10.5)
CHLORIDE SERPL-SCNC: 101 MMOL/L (ref 96–112)
CHLORIDE SERPL-SCNC: 102 MMOL/L (ref 96–112)
CO2 SERPL-SCNC: 26 MMOL/L (ref 20–33)
CO2 SERPL-SCNC: 27 MMOL/L (ref 20–33)
COLOR UR: YELLOW
CREAT SERPL-MCNC: 1.35 MG/DL (ref 0.5–1.4)
CREAT SERPL-MCNC: 1.37 MG/DL (ref 0.5–1.4)
CREAT UR-MCNC: 18.5 MG/DL
EOSINOPHIL # BLD AUTO: 0.16 K/UL (ref 0–0.51)
EOSINOPHIL NFR BLD: 1.9 % (ref 0–6.9)
ERYTHROCYTE [DISTWIDTH] IN BLOOD BY AUTOMATED COUNT: 38.9 FL (ref 35.9–50)
GLOBULIN SER CALC-MCNC: 3.1 G/DL (ref 1.9–3.5)
GLUCOSE SERPL-MCNC: 82 MG/DL (ref 65–99)
GLUCOSE SERPL-MCNC: 83 MG/DL (ref 65–99)
GLUCOSE UR STRIP.AUTO-MCNC: NEGATIVE MG/DL
HCT VFR BLD AUTO: 45.9 % (ref 37–47)
HGB BLD-MCNC: 15.8 G/DL (ref 12–16)
IMM GRANULOCYTES # BLD AUTO: 0.02 K/UL (ref 0–0.11)
IMM GRANULOCYTES NFR BLD AUTO: 0.2 % (ref 0–0.9)
KETONES UR STRIP.AUTO-MCNC: NEGATIVE MG/DL
LEUKOCYTE ESTERASE UR QL STRIP.AUTO: NEGATIVE
LYMPHOCYTES # BLD AUTO: 2.49 K/UL (ref 1–4.8)
LYMPHOCYTES NFR BLD: 30.3 % (ref 22–41)
MAGNESIUM SERPL-MCNC: 1.9 MG/DL (ref 1.5–2.5)
MCH RBC QN AUTO: 30.9 PG (ref 27–33)
MCHC RBC AUTO-ENTMCNC: 34.4 G/DL (ref 33.6–35)
MCV RBC AUTO: 89.6 FL (ref 81.4–97.8)
MICRO URNS: NORMAL
MONOCYTES # BLD AUTO: 0.44 K/UL (ref 0–0.85)
MONOCYTES NFR BLD AUTO: 5.3 % (ref 0–13.4)
NEUTROPHILS # BLD AUTO: 5.09 K/UL (ref 2–7.15)
NEUTROPHILS NFR BLD: 61.9 % (ref 44–72)
NITRITE UR QL STRIP.AUTO: NEGATIVE
NRBC # BLD AUTO: 0 K/UL
NRBC BLD-RTO: 0 /100 WBC
PH UR STRIP.AUTO: 7 [PH] (ref 5–8)
PHOSPHATE SERPL-MCNC: 3.1 MG/DL (ref 2.5–4.5)
PLATELET # BLD AUTO: 223 K/UL (ref 164–446)
PMV BLD AUTO: 11.9 FL (ref 9–12.9)
POTASSIUM SERPL-SCNC: 4 MMOL/L (ref 3.6–5.5)
POTASSIUM SERPL-SCNC: 4.1 MMOL/L (ref 3.6–5.5)
PROT SERPL-MCNC: 7.4 G/DL (ref 6–8.2)
PROT UR QL STRIP: NEGATIVE MG/DL
PROT UR-MCNC: <4 MG/DL (ref 0–15)
PROT/CREAT UR: NORMAL MG/G (ref 10–107)
PTH-INTACT SERPL-MCNC: 35.8 PG/ML (ref 14–72)
RBC # BLD AUTO: 5.12 M/UL (ref 4.2–5.4)
RBC UR QL AUTO: NEGATIVE
SODIUM SERPL-SCNC: 136 MMOL/L (ref 135–145)
SODIUM SERPL-SCNC: 137 MMOL/L (ref 135–145)
SP GR UR STRIP.AUTO: 1.01
URATE SERPL-MCNC: 6.3 MG/DL (ref 1.9–8.2)
UROBILINOGEN UR STRIP.AUTO-MCNC: 0.2 MG/DL
WBC # BLD AUTO: 8.2 K/UL (ref 4.8–10.8)

## 2019-11-22 PROCEDURE — 81003 URINALYSIS AUTO W/O SCOPE: CPT

## 2019-11-22 PROCEDURE — 82306 VITAMIN D 25 HYDROXY: CPT

## 2019-11-22 PROCEDURE — 84156 ASSAY OF PROTEIN URINE: CPT

## 2019-11-22 PROCEDURE — 85025 COMPLETE CBC W/AUTO DIFF WBC: CPT

## 2019-11-22 PROCEDURE — 80053 COMPREHEN METABOLIC PANEL: CPT

## 2019-11-22 PROCEDURE — 36415 COLL VENOUS BLD VENIPUNCTURE: CPT

## 2019-11-22 PROCEDURE — 84443 ASSAY THYROID STIM HORMONE: CPT

## 2019-11-22 PROCEDURE — 83970 ASSAY OF PARATHORMONE: CPT

## 2019-11-22 PROCEDURE — 82570 ASSAY OF URINE CREATININE: CPT

## 2019-11-22 PROCEDURE — 80069 RENAL FUNCTION PANEL: CPT

## 2019-11-22 PROCEDURE — 84550 ASSAY OF BLOOD/URIC ACID: CPT

## 2019-11-22 PROCEDURE — 83735 ASSAY OF MAGNESIUM: CPT

## 2019-11-23 LAB — TSH SERPL DL<=0.005 MIU/L-ACNC: 0.55 UIU/ML (ref 0.38–5.33)

## 2019-12-06 ENCOUNTER — APPOINTMENT (OUTPATIENT)
Dept: RADIOLOGY | Facility: IMAGING CENTER | Age: 54
End: 2019-12-06
Attending: PHYSICIAN ASSISTANT
Payer: COMMERCIAL

## 2019-12-06 ENCOUNTER — APPOINTMENT (OUTPATIENT)
Dept: URGENT CARE | Facility: PHYSICIAN GROUP | Age: 54
End: 2019-12-06
Payer: COMMERCIAL

## 2019-12-06 ENCOUNTER — OCCUPATIONAL MEDICINE (OUTPATIENT)
Dept: URGENT CARE | Facility: PHYSICIAN GROUP | Age: 54
End: 2019-12-06
Payer: COMMERCIAL

## 2019-12-06 VITALS
BODY MASS INDEX: 26.52 KG/M2 | RESPIRATION RATE: 16 BRPM | OXYGEN SATURATION: 97 % | HEIGHT: 66 IN | HEART RATE: 76 BPM | WEIGHT: 165 LBS | SYSTOLIC BLOOD PRESSURE: 120 MMHG | TEMPERATURE: 97.9 F | DIASTOLIC BLOOD PRESSURE: 82 MMHG

## 2019-12-06 DIAGNOSIS — S60.211A CONTUSION OF RIGHT WRIST, INITIAL ENCOUNTER: ICD-10-CM

## 2019-12-06 PROCEDURE — 73110 X-RAY EXAM OF WRIST: CPT | Mod: TC,FY,RT,29 | Performed by: NURSE PRACTITIONER

## 2019-12-06 PROCEDURE — 99214 OFFICE O/P EST MOD 30 MIN: CPT | Mod: 29 | Performed by: PHYSICIAN ASSISTANT

## 2019-12-06 NOTE — LETTER
"EMPLOYEE’S CLAIM FOR COMPENSATION/ REPORT OF INITIAL TREATMENT  FORM C-4    EMPLOYEE’S CLAIM - PROVIDE ALL INFORMATION REQUESTED   First Name  Susi Last Name  Compa Birthdate                    1965                Sex  female Claim Number   Home Address  [address] Age  54 y.o. 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 (if applicable)  Walking back to my classroom    How did this injury or occupational disease occur? (Be specific an answer in detail. Use additional sheet if necessary)  As I was walking down the ramp outside of the elementary office, my feet slipped on a patch of ice and I fell down, trying to catch myself but landed on butt & right wrist   If you believe that you have an occupational disease, when did you first have knowledge of the disability and it relationship to your employment?  N/A Witnesses to the Accident  None      Nature of Injury or Occupational Disease  Sprain  Part(s) of Body Injured or Affected  Buttocks, Wrist (R) and Hand (R), Defer    I certify that the above is true and " correct to the best of my knowledge and that I have provided this information in order to obtain the benefits of [place] of [place]).  I hereby authorize any physician, chiropractor, surgeon, practitioner, or other person, any hospital, including [facility] or [facility], any medical service organization, any insurance company, or other institution or organization to release to each other, any medical or other information, including benefits paid or payable, pertinent to this injury or disease, except information relative to diagnosis, treatment and/or counseling for AIDS, psychological conditions, alcohol or controlled substances, for which I must give specific authorization.  A Photostat of this authorization shall be as valid as the original.     Date 12/06/2019   [facility] joseline   Employee’s Signature   THIS REPORT MUST BE COMPLETED AND MAILED WITHIN 3 WORKING DAYS OF TREATMENT   [facility]  Name of [facility]  Joseline   Date  12/6/2019 Diagnosis  (S60.211A) Contusion of right wrist, initial encounter Is there evidence the injured employee was under the influence of alcohol and/or another controlled substance at the time of accident?   Hour  1:07 PM Description of Injury or Disease  The encounter diagnosis was Contusion of right wrist, initial encounter. No   Treatment  Ace wrap, ice, ibuprofen, x-ray pending  Have you advised the patient to remain off work five days or more? No   X-Ray Findings      If Yes   From Date  To Date      From information given by the employee, together with medical evidence, can you directly connect this injury or occupational disease as job incurred?  Yes If No Full Duty No Modified Duty  Yes   Is additional medical care by a physician indicated?  Yes  Comments:Follow-up in 1 week If Modified Duty, Specify any Limitations / Restrictions  No lifting  "more than 10 pounds with right hand   Do you know of any previous injury or disease contributing to this condition or occupational disease?                            No   Date  12/6/2019 Print Doctor’s Name Quiana Early P.A.-C. I certify the employer’s copy of  this form was mailed on:   Address  [address] Insurer’s Use Only     [place]  [address]    Provider’s Tax ID Number  [id number] Telephone  Dept: [phone]        e-SignSTAQUIANA MCNAMARA P.A.-C.   e-Signature: Dr. Haja Hagan,   Medical Director Degree  MD        ORIGINAL-TREATING PHYSICIAN OR CHIROPRACTOR    PAGE 2-INSURER/TPA    PAGE 3-EMPLOYER    PAGE 4-EMPLOYEE             Form C-4 (rev.10/07)              BRIEF DESCRIPTION OF RIGHTS AND BENEFITS  (Pursuant to NRS 616C.050)    Notice of Injury or Occupational Disease (Incident Report Form C-1): If an injury or occupational disease (OD) arises out of and in the course of employment, you must provide written notice to your employer as soon as practicable, but no later than 7 days after the accident or OD. Your employer shall maintain a sufficient supply of the required forms.    Claim for Compensation (Form C-4): If medical treatment is sought, the form C-4 is available at the place of initial treatment. A completed \"Claim for Compensation\" (Form C-4) must be filed within 90 days after an accident or OD. The treating physician or chiropractor must, within 3 working days after treatment, complete and mail to the employer, the employer's insurer and third-party , the Claim for Compensation.    Medical Treatment: If you require medical treatment for your on-the-job injury or OD, you may be required to select a physician or chiropractor from a list provided by your workers’ compensation insurer, if it has contracted with an Organization for Managed Care (MCO) or Preferred Provider Organization (PPO) or providers of health care. If your employer has not entered " into a contract with an MCO or PPO, you may select a physician or chiropractor from the Panel of Physicians and Chiropractors. Any medical costs related to your industrial injury or OD will be paid by your insurer.    Temporary Total Disability (TTD): If your doctor has certified that you are unable to work for a period of at least 5 consecutive days, or 5 cumulative days in a 20-day period, or places restrictions on you that your employer does not accommodate, you may be entitled to TTD compensation.    Temporary Partial Disability (TPD): If the wage you receive upon reemployment is less than the compensation for TTD to which you are entitled, the insurer may be required to pay you TPD compensation to make up the difference. TPD can only be paid for a maximum of 24 months.    Permanent Partial Disability (PPD): When your medical condition is stable and there is an indication of a PPD as a result of your injury or OD, within 30 days, your insurer must arrange for an evaluation by a rating physician or chiropractor to determine the degree of your PPD. The amount of your PPD award depends on the date of injury, the results of the PPD evaluation and your age and wage.    Permanent Total Disability (PTD): If you are medically certified by a treating physician or chiropractor as permanently and totally disabled and have been granted a PTD status by your insurer, you are entitled to receive monthly benefits not to exceed 66 2/3% of your average monthly wage. The amount of your PTD payments is subject to reduction if you previously received a PPD award.    Vocational Rehabilitation Services: You may be eligible for vocational rehabilitation services if you are unable to return to the job due to a permanent physical impairment or permanent restrictions as a result of your injury or occupational disease.    Transportation and Per Leonel Reimbursement: You may be eligible for travel expenses and per leonel associated with medical  treatment.    Reopening: You may be able to reopen your claim if your condition worsens after claim closure.    Appeal Process: If you disagree with a written determination issued by the insurer or the insurer does not respond to your request, you may appeal to the Department of Administration, , by following the instructions contained in your determination letter. You must appeal the determination within 70 days from the date of the determination letter at [address], [address], [place], [place] [address], or [address], [place], [place] [address]. If you disagree with the  decision, you may appeal to the Department of Administration, . You must file your appeal within 30 days from the date of the  decision letter at [address], [address], [place], [place] [address], or [address] [facility] [address], [place], [place] [address]. If you disagree with a decision of an , you may file a petition for judicial review with the District Court. You must do so within 30 days of the Appeal Officer’s decision. You may be represented by an  at your own expense or you may contact the [facility] for possible representation.    [place]  for Injured Workers (NAIW): If you disagree with a  decision, you may request that NAIW represent you without charge at an  Hearing. For information regarding denial of benefits, you may contact the [facility] at: [address], [address], [place] [address], [phone], or [address], [address], [place] [address], [phone]    To File a Complaint with the Division: If you wish to file a complaint with the  of the Division of Industrial Relations (DIR),  please contact the Workers’ Compensation Section, [address], [address], [place], [place] [address], telephone [phone]) [phone], or [address], [address], [place], [place] [address], telephone [phone].    For assistance with Workers’ Compensation Issues: You may contact the Office of the Governor Consumer Health Assistance, [address] [place] [address], [address], [place], [place] [address], Toll Free 1-690.835.8030, Web site: [email or web address]., E-mail [email or web address]                    12/06/2019        __________________________________________________________________                                                     _________        Employee Name / Signature                                                                                                                                              Date                                                                                                                                                                                                     D-2 (rev. 06/18)

## 2019-12-06 NOTE — PROGRESS NOTES
Chief Complaint   Patient presents with   • Wrist Injury       HISTORY OF PRESENT ILLNESS: Patient is a 54 y.o. female who presents today because she is here for a Worker's Comp. visit.    Date of injury is 12/2/2019.  This is her first visit in urgent care.  On the date of injury she fell on slippery cement, falling backwards and landing on her right wrist.  She is left-handed, does not have a second job.  She has had pain ever since.  She has been icing it and using ibuprofen without any significant improvement.  Denies any distal paresthesias.    Patient Active Problem List    Diagnosis Date Noted   • Mixed stress and urge urinary incontinence 05/28/2019   • Essential hypertension 12/21/2018   • Mixed hyperlipidemia 12/21/2018   • Stage 3 chronic kidney disease (HCC) 12/21/2018   • Acquired hypothyroidism 12/21/2018   • Post-menopause 12/21/2018       Allergies:Aspirin    Current Outpatient Medications Ordered in Epic   Medication Sig Dispense Refill   • levothyroxine (SYNTHROID) 112 MCG Tab TAKE 1 TABLET BY MOUTH ONCE DAILY IN THE MORNING ON  AN  EMPTY  STOMACH 90 Tab 2   • progesterone (PROMETRIUM) 200 MG capsule Take 1 Cap by mouth every day. 30 Cap 11   • oxybutynin SR (DITROPAN-XL) 10 MG CR tablet Take 1 Tab by mouth every day. 30 Tab 6   • lovastatin (MEVACOR) 20 MG Tab Take 10 mg by mouth every evening.     • Omega-3 Fatty Acids (FISH OIL) 1000 MG Cap capsule Take 1,000 mg by mouth 3 times a day, with meals.     • Red Yeast Rice Extract (RED YEAST RICE PO) Take  by mouth.     • vitamin D (CHOLECALCIFEROL) 1000 UNIT Tab Take 2,000 Units by mouth every day.     • B Complex-C (SUPER B COMPLEX PO) Take  by mouth.       No current Epic-ordered facility-administered medications on file.        Past Medical History:   Diagnosis Date   • Hyperlipidemia    • Hypertension    • Kidney disease, chronic, stage III (moderate, EGFR 30-59 ml/min) ([facility]) 2011   • Thyroid disease        Social History     Tobacco Use   •  "Smoking status: Former Smoker     Packs/day: 0.00     Last attempt to quit: 2000     Years since quittin.9   • Smokeless tobacco: Never Used   Substance Use Topics   • Alcohol use: Yes     Alcohol/week: 0.6 oz     Types: 1 Glasses of wine per week     Comment: rare   • Drug use: No       Family Status   Relation Name Status   • Mo             • Fa  Alive   • MGMo       Family History   Problem Relation Age of Onset   • Other Mother         Parkinson's   • Kidney Disease Father    • Hyperlipidemia Father    • Hypertension Father    • Kidney Disease Maternal Grandmother        ROS:  Review of Systems   Constitutional: Negative for fever, chills, weight loss and malaise/fatigue.   HENT: Negative for ear pain, nosebleeds, congestion, sore throat and neck pain.    Eyes: Negative for blurred vision.   Respiratory: Negative for cough, sputum production, shortness of breath and wheezing.    Cardiovascular: Negative for chest pain, palpitations, orthopnea and leg swelling.   Gastrointestinal: Negative for heartburn, nausea, vomiting and abdominal pain.   Genitourinary: Negative for dysuria, urgency and frequency.     Exam:  /82 (BP Location: Right arm, Patient Position: Sitting, BP Cuff Size: Adult)   Pulse 76   Temp 36.6 °C (97.9 °F) (Temporal)   Resp 16   Ht 1.676 m (5' 6\")   Wt 74.8 kg (165 lb)   SpO2 97%   General:  Well nourished, well developed female in NAD  Head:Normocephalic, atraumatic  Eyes: PERRLA, EOM within normal limits, no conjunctival injection, no scleral icterus, visual fields and acuity grossly intact.  Nose: Symmetrical without tenderness, no discharge.  Mouth: reasonable hygiene, no erythema exudates or tonsillar enlargement.  Extremities: no clubbing, cyanosis, or edema.  Right wrist is without any visual deformity, erythema, edema or ecchymosis.  She has tenderness to palpation over the ulnar styloid.  Good distal range of motion, strength, circulation and " sensation.    X-ray by radiology interpretation:  IMPRESSION:     No radiographic evidence of acute traumatic injury.     Ulnar minus variance    Please note that this dictation was created using voice recognition software. I have made every reasonable attempt to correct obvious errors, but I expect that there are errors of grammar and possibly content that I did not discover before finalizing the note.    Assessment/Plan:  1. Contusion of right wrist, initial encounter     Ace wrap, gentle range of motion, ice, ibuprofen, follow-up in a week

## 2019-12-06 NOTE — LETTER
[facility]  [facility] [place] [address]  Phone:  [phone] - Fax:  [phone]   [facility] Progress Report and Disability Certification  Date of Service: 12/6/2019   No Show:  No  Date / Time of Next Visit: 12/13/2019   Claim Information   Patient Name: Susi Chatman  Claim Number:     Employer:   [employer] Date of Injury: 12/2/2019     Insurer / TPA: [employer]  ID / SSN: [id number]    Occupation: Teacher  Diagnosis: The encounter diagnosis was Contusion of right wrist, initial encounter.    Medical Information   Related to Industrial Injury? Yes    Subjective Complaints:  Right wrist pain after fall at work 4 days ago   Objective Findings: Right wrist is without any visual deformity, erythema, edema or ecchymosis.  She has tenderness to palpation over the ulnar styloid.  Good distal range of motion, strength, circulation and sensation.     Pre-Existing Condition(s):     Assessment:   Initial Visit    Status: Additional Care Required  Comments:Follow-up in a week  Permanent Disability:No    Plan:      Diagnostics: X-ray  Comments:Pending    Comments:       Disability Information   Status: Released to Restricted Duty    From:  12/6/2019  Through: 12/13/2019 Restrictions are: Temporary   Physical Restrictions   Sitting:    Standing:    Stooping:    Bending:      Squatting:    Walking:    Climbing:    Pushing:      Pulling:    Other:    Reaching Above Shoulder (L):   Reaching Above Shoulder (R):       Reaching Below Shoulder (L):    Reaching Below Shoulder (R):      Not to exceed Weight Limits   Carrying(hrs):   Weight Limit(lb): < or = to 10 pounds  Comments:With right hand Lifting(hrs):   Weight  Limit(lb): < or = to 10 pounds  Comments:With right hand   Comments:      Repetitive Actions   Hands: i.e. Fine Manipulations from Grasping:     Feet: i.e. Operating Foot Controls:     Driving / Operate Machinery:     Physician Name: Naeem Early  CAROLINE Physician Signature: QUIANA Ramírez P.A.-C. e-Signature: Dr. Haja Hagan, Medical Director   Clinic Name / Location: [address] [address] Clinic Phone Number: Dept: [phone]   Appointment Time: 12:45 Pm Visit Start Time: 1:07 PM   Check-In Time:  12:52 Pm Visit Discharge Time:  1:37 pm    Original-Treating Physician or Chiropractor    Page 2-Insurer/TPA    Page 3-Employer    Page 4-Employee

## 2019-12-06 NOTE — LETTER
[facility]  [facility] [place] [address]  Phone:  [phone] - Fax:  [phone]   [facility] Progress Report and Disability Certification  Date of Service: 12/6/2019   No Show:  No  Date / Time of Next Visit: 12/13/2019   Claim Information   Patient Name: Susi Chatman  Claim Number:     Employer:   [employer] Date of Injury: 12/2/2019     Insurer / TPA: [facility]  ID / SSN:     Occupation: Teacher  Diagnosis: The encounter diagnosis was Contusion of right wrist, initial encounter.    Medical Information   Related to Industrial Injury? Yes    Subjective Complaints:  Right wrist pain after fall at work 4 days ago   Objective Findings: Right wrist is without any visual deformity, erythema, edema or ecchymosis.  She has tenderness to palpation over the ulnar styloid.  Good distal range of motion, strength, circulation and sensation.     Pre-Existing Condition(s):     Assessment:   Initial Visit    Status: Additional Care Required  Comments:Follow-up in a week  Permanent Disability:No    Plan:      Diagnostics: X-ray  Comments:Pending    Comments:       Disability Information   Status: Released to Restricted Duty    From:  12/6/2019  Through: 12/13/2019 Restrictions are: Temporary   Physical Restrictions   Sitting:    Standing:    Stooping:    Bending:      Squatting:    Walking:    Climbing:    Pushing:      Pulling:    Other:    Reaching Above Shoulder (L):   Reaching Above Shoulder (R):       Reaching Below Shoulder (L):    Reaching Below Shoulder (R):      Not to exceed Weight Limits   Carrying(hrs):   Weight Limit(lb): < or = to 10 pounds  Comments:With right hand Lifting(hrs):   Weight  Limit(lb): < or = to 10 pounds  Comments:With right hand   Comments:      Repetitive Actions   Hands: i.e. Fine Manipulations from Grasping:     Feet: i.e. Operating Foot Controls:     Driving / Operate Machinery:     Physician Name: Naeem Early  CAROLINE Physician Signature: QUIANA Ramírez P.A.-C. e-Signature: Dr. Haja Hagan, Medical Director   Clinic Name / Location: [address] [address] Clinic Phone Number: Dept: [phone]   Appointment Time: 12:45 Pm Visit Start Time: 1:07 PM   Check-In Time:  12:52 Pm Visit Discharge Time:  1:36 pm    Original-Treating Physician or Chiropractor    Page 2-Insurer/TPA    Page 3-Employer    Page 4-Employee

## 2019-12-13 ENCOUNTER — OCCUPATIONAL MEDICINE (OUTPATIENT)
Dept: URGENT CARE | Facility: PHYSICIAN GROUP | Age: 54
End: 2019-12-13
Payer: COMMERCIAL

## 2019-12-13 ENCOUNTER — OFFICE VISIT (OUTPATIENT)
Dept: MEDICAL GROUP | Facility: MEDICAL CENTER | Age: 54
End: 2019-12-13
Payer: COMMERCIAL

## 2019-12-13 VITALS
WEIGHT: 163.4 LBS | HEIGHT: 66 IN | SYSTOLIC BLOOD PRESSURE: 106 MMHG | DIASTOLIC BLOOD PRESSURE: 62 MMHG | HEART RATE: 73 BPM | RESPIRATION RATE: 14 BRPM | BODY MASS INDEX: 26.26 KG/M2 | TEMPERATURE: 98.6 F | OXYGEN SATURATION: 95 %

## 2019-12-13 VITALS
TEMPERATURE: 98 F | DIASTOLIC BLOOD PRESSURE: 74 MMHG | HEART RATE: 63 BPM | HEIGHT: 66 IN | OXYGEN SATURATION: 97 % | BODY MASS INDEX: 26.36 KG/M2 | RESPIRATION RATE: 18 BRPM | WEIGHT: 164 LBS | SYSTOLIC BLOOD PRESSURE: 140 MMHG

## 2019-12-13 DIAGNOSIS — E78.2 MIXED HYPERLIPIDEMIA: ICD-10-CM

## 2019-12-13 DIAGNOSIS — E03.9 ACQUIRED HYPOTHYROIDISM: ICD-10-CM

## 2019-12-13 DIAGNOSIS — Z78.0 POST-MENOPAUSE: ICD-10-CM

## 2019-12-13 DIAGNOSIS — R23.2 HOT FLASHES: ICD-10-CM

## 2019-12-13 DIAGNOSIS — I10 ESSENTIAL HYPERTENSION: ICD-10-CM

## 2019-12-13 DIAGNOSIS — N18.30 STAGE 3 CHRONIC KIDNEY DISEASE (HCC): ICD-10-CM

## 2019-12-13 DIAGNOSIS — Z12.11 SCREENING FOR COLON CANCER: ICD-10-CM

## 2019-12-13 DIAGNOSIS — N39.46 MIXED STRESS AND URGE URINARY INCONTINENCE: ICD-10-CM

## 2019-12-13 PROCEDURE — 99214 OFFICE O/P EST MOD 30 MIN: CPT | Performed by: PHYSICIAN ASSISTANT

## 2019-12-13 RX ORDER — LOVASTATIN 20 MG/1
10 TABLET ORAL EVERY EVENING
Qty: 90 TAB | Refills: 3 | Status: SHIPPED | OUTPATIENT
Start: 2019-12-13

## 2019-12-13 RX ORDER — LOVASTATIN 10 MG/1
10 TABLET ORAL
Refills: 3 | COMMUNITY
Start: 2019-11-03

## 2019-12-13 RX ORDER — LEVOTHYROXINE SODIUM 112 UG/1
TABLET ORAL
Qty: 90 TAB | Refills: 2 | Status: CANCELLED | OUTPATIENT
Start: 2019-12-13

## 2019-12-13 RX ORDER — LOSARTAN POTASSIUM 25 MG/1
50 TABLET ORAL
Refills: 2 | COMMUNITY
Start: 2019-10-11 | End: 2019-12-13 | Stop reason: SDUPTHER

## 2019-12-13 RX ORDER — ESTRADIOL 0.5 MG/1
0.5 TABLET ORAL DAILY
Qty: 90 TAB | Refills: 3 | Status: SHIPPED | OUTPATIENT
Start: 2019-12-13

## 2019-12-13 RX ORDER — ESTRADIOL 0.5 MG/1
0.5 TABLET ORAL
Refills: 3 | COMMUNITY
Start: 2019-11-03 | End: 2019-12-13 | Stop reason: SDUPTHER

## 2019-12-13 RX ORDER — LOSARTAN POTASSIUM 25 MG/1
50 TABLET ORAL DAILY
Qty: 90 TAB | Refills: 3 | Status: SHIPPED | OUTPATIENT
Start: 2019-12-13 | End: 2020-07-15

## 2019-12-13 RX ORDER — LEVOTHYROXINE SODIUM 0.1 MG/1
100 TABLET ORAL
Qty: 90 TAB | Refills: 3 | Status: SHIPPED | OUTPATIENT
Start: 2019-12-13

## 2019-12-13 RX ORDER — OXYBUTYNIN CHLORIDE 10 MG/1
10 TABLET, EXTENDED RELEASE ORAL DAILY
Qty: 90 TAB | Refills: 3 | Status: SHIPPED | OUTPATIENT
Start: 2019-12-13

## 2019-12-13 RX ORDER — OXYBUTYNIN CHLORIDE 5 MG/1
10 TABLET, EXTENDED RELEASE ORAL
Refills: 0 | COMMUNITY
Start: 2019-11-03

## 2019-12-14 NOTE — ASSESSMENT & PLAN NOTE
Chronic, stable on current losartan, slight decrease in GFR and increase in creatinine on most recent labs, possibly d/t stress and weight gain, will f/u with nephrology in April

## 2019-12-14 NOTE — PROGRESS NOTES
Subjective:   Susi Chatman is a 54 y.o. female here today for follow up on chronic conditions. Thinking of moving to [place] sometime next year.     Essential hypertension  Chronic, stable on current losartan, slight decrease in GFR and increase in creatinine on most recent labs, possibly d/t stress and weight gain, will f/u with nephrology in April    Mixed hyperlipidemia  Chronic, stable on current statin, labs up to date    Acquired hypothyroidism  Taking 100mcg daily, TSH from 11/22/19 wnl    Post-menopause  Chronic, stable on current estrogen and progesterone, up to date mammogram and pap/pelvic    Mixed stress and urge urinary incontinence  Chronic, stable on current ditropan       Current medicines (including changes today)  Current Outpatient Medications   Medication Sig Dispense Refill   • estradiol (ESTRACE) 0.5 MG tablet Take 1 Tab by mouth every day. 90 Tab 3   • losartan (COZAAR) 25 MG Tab Take 2 Tabs by mouth every day. 90 Tab 3   • lovastatin (MEVACOR) 20 MG Tab Take 0.5 Tabs by mouth every evening. 90 Tab 3   • oxybutynin SR (DITROPAN-XL) 10 MG CR tablet Take 1 Tab by mouth every day. 90 Tab 3   • progesterone (PROMETRIUM) 200 MG capsule Take 1 Cap by mouth every day. 90 Cap 3   • levothyroxine (SYNTHROID) 100 MCG Tab Take 1 Tab by mouth Every morning on an empty stomach. 90 Tab 3   • levothyroxine (SYNTHROID) 112 MCG Tab TAKE 1 TABLET BY MOUTH ONCE DAILY IN THE MORNING ON  AN  EMPTY  STOMACH 90 Tab 2   • Omega-3 Fatty Acids (FISH OIL) 1000 MG Cap capsule Take 1,000 mg by mouth 3 times a day, with meals.     • Red Yeast Rice Extract (RED YEAST RICE PO) Take  by mouth.     • vitamin D (CHOLECALCIFEROL) 1000 UNIT Tab Take 2,000 Units by mouth every day.     • B Complex-C (SUPER B COMPLEX PO) Take  by mouth.       No current facility-administered medications for this visit.      She  has a past medical history of Hyperlipidemia, Hypertension, Kidney disease, chronic, stage III (moderate, EGFR  "30-59 ml/min) ([facility]) (2011), and Thyroid disease.    ROS   No fever/chills.No headache/dizziness. No focal weakness. No sore throat, nasal congestion, ear pain. No chest pain, no shortness of breath, difficulty breathing. No n/v/d/c or abdominal pain. No urinary complaint. No rash or skin lesion. No joint pain or swelling.       Objective:     /62 (BP Location: Right arm, Patient Position: Sitting, BP Cuff Size: Adult long)   Pulse 73   Temp 37 °C (98.6 °F) (Temporal)   Resp 14   Ht 1.676 m (5' 6\")   Wt 74.1 kg (163 lb 6.4 oz)   SpO2 95%  Body mass index is 26.37 kg/m².   Physical Exam:  Constitutional: WDWN, NAD  Skin: Warm, dry, good turgor, no rashes in visible areas.  Respiratory: Unlabored respiratory effort, lungs clear to auscultation, no wheezes, no ronchi.  Cardiovascular: Normal S1, S2, no murmur, no leg edema.  Psych: Alert and oriented x3, normal affect and mood.    Assessment and Plan:   The following treatment plan was discussed    1. Acquired hypothyroidism    - levothyroxine (SYNTHROID) 100 MCG Tab; Take 1 Tab by mouth Every morning on an empty stomach.  Dispense: 90 Tab; Refill: 3    2. Mixed stress and urge urinary incontinence    - oxybutynin SR (DITROPAN-XL) 10 MG CR tablet; Take 1 Tab by mouth every day.  Dispense: 90 Tab; Refill: 3    3. Hot flashes    - estradiol (ESTRACE) 0.5 MG tablet; Take 1 Tab by mouth every day.  Dispense: 90 Tab; Refill: 3  - progesterone (PROMETRIUM) 200 MG capsule; Take 1 Cap by mouth every day.  Dispense: 90 Cap; Refill: 3    4. Mixed hyperlipidemia    - lovastatin (MEVACOR) 20 MG Tab; Take 0.5 Tabs by mouth every evening.  Dispense: 90 Tab; Refill: 3    5. Post-menopause    - estradiol (ESTRACE) 0.5 MG tablet; Take 1 Tab by mouth every day.  Dispense: 90 Tab; Refill: 3  - progesterone (PROMETRIUM) 200 MG capsule; Take 1 Cap by mouth every day.  Dispense: 90 Cap; Refill: 3    6. Stage 3 chronic kidney disease (HCC)    - losartan (COZAAR) 25 MG Tab; Take 2 " Tabs by mouth every day.  Dispense: 90 Tab; Refill: 3    7. Essential hypertension    - losartan (COZAAR) 25 MG Tab; Take 2 Tabs by mouth every day.  Dispense: 90 Tab; Refill: 3    8. Screening for colon cancer  - REFERRAL TO GI FOR COLONOSCOPY      Followup: April 2020 and as needed

## 2019-12-20 ENCOUNTER — OCCUPATIONAL MEDICINE (OUTPATIENT)
Dept: URGENT CARE | Facility: PHYSICIAN GROUP | Age: 54
End: 2019-12-20
Payer: COMMERCIAL

## 2019-12-20 VITALS
SYSTOLIC BLOOD PRESSURE: 116 MMHG | TEMPERATURE: 97.5 F | BODY MASS INDEX: 26.36 KG/M2 | HEART RATE: 92 BPM | WEIGHT: 164 LBS | DIASTOLIC BLOOD PRESSURE: 66 MMHG | RESPIRATION RATE: 14 BRPM | HEIGHT: 66 IN | OXYGEN SATURATION: 98 %

## 2019-12-20 DIAGNOSIS — S60.211D CONTUSION OF RIGHT WRIST, SUBSEQUENT ENCOUNTER: ICD-10-CM

## 2019-12-20 PROCEDURE — 99214 OFFICE O/P EST MOD 30 MIN: CPT | Mod: 29 | Performed by: PHYSICIAN ASSISTANT

## 2019-12-20 NOTE — LETTER
[address] [place] [address]  Phone:  [phone] - Fax:  [phone]   [facility] Progress Report and Disability Certification  Date of Service: 12/20/2019   No Show:  No  Date / Time of Next Visit:     Claim Information   Patient Name: Susi Chatman  Claim Number:     Employer:   [employer] Date of Injury: 12/2/2019     Insurer / TPA: [employer]  ID / SSN:     Occupation: Teacher  Diagnosis: The encounter diagnosis was Contusion of right wrist, subsequent encounter.    Medical Information   Related to Industrial Injury? Yes    Subjective Complaints:  Resolved right wrist pain after fall at work   Objective Findings:  Right wrist is without any visual deformity, erythema, edema or ecchymosis.  She has good range of motion, good distal circulation and sensation.     Pre-Existing Condition(s):     Assessment:   Condition Improved    Status: Discharged /  MMI  Permanent Disability:No    Plan:      Diagnostics:      Comments:       Disability Information   Status: Released to Full Duty    From:     Through:   Restrictions are:     Physical Restrictions   Sitting:    Standing:    Stooping:    Bending:      Squatting:    Walking:    Climbing:    Pushing:      Pulling:    Other:    Reaching Above Shoulder (L):   Reaching Above Shoulder (R):       Reaching Below Shoulder (L):    Reaching Below Shoulder (R):      Not to exceed Weight Limits   Carrying(hrs):   Weight Limit(lb):   Lifting(hrs):   Weight  Limit(lb):     Comments:      Repetitive Actions   Hands: i.e. Fine Manipulations from Grasping:     Feet: i.e. Operating Foot Controls:     Driving / Operate Machinery:     Physician Name: Quiana Early P.A.-C. Physician Signature: QUIANA Ramírez P.A.-C. e-Signature: Dr. Haja Hagan, Medical Director   Clinic Name / Location: [address] [address] Clinic Phone Number: Dept:  [phone]   Appointment Time: 2:00 Pm Visit Start Time: 1:51 PM   Check-In Time:  1:45 Pm Visit Discharge Time:  12:20 pm   Original-Treating Physician or Chiropractor    Page 2-Insurer/TPA    Page 3-Employer    Page 4-Employee

## 2019-12-20 NOTE — PROGRESS NOTES
Chief Complaint   Patient presents with   • Follow-Up       HISTORY OF PRESENT ILLNESS: Patient is a 54 y.o. female who presents today because she is following up on a work comp visit.    Date of injury is 12/2/2019.  This is her second visit in urgent care.  On the date of injury she fell on slippery cement, falling backwards and landing on her right wrist.  She is left-handed, does not have a second job.  She experienced pain in her right wrist..  She had been icing it and using ibuprofen without any significant improvement.  Denies any distal paresthesias.  She had a negative x-ray.  Since her last visit she feels that it is back to normal with the exception of minor twinges of pain if she moves in certain directions    Patient Active Problem List    Diagnosis Date Noted   • Mixed stress and urge urinary incontinence 05/28/2019   • Essential hypertension 12/21/2018   • Mixed hyperlipidemia 12/21/2018   • Stage 3 chronic kidney disease (HCC) 12/21/2018   • Acquired hypothyroidism 12/21/2018   • Post-menopause 12/21/2018       Allergies:Aspirin; Coconut oil; and Peanut oil    Current Outpatient Medications Ordered in Epic   Medication Sig Dispense Refill   • estradiol (ESTRACE) 0.5 MG tablet Take 1 Tab by mouth every day. 90 Tab 3   • losartan (COZAAR) 25 MG Tab Take 2 Tabs by mouth every day. 90 Tab 3   • lovastatin (MEVACOR) 20 MG Tab Take 0.5 Tabs by mouth every evening. 90 Tab 3   • oxybutynin SR (DITROPAN-XL) 10 MG CR tablet Take 1 Tab by mouth every day. 90 Tab 3   • progesterone (PROMETRIUM) 200 MG capsule Take 1 Cap by mouth every day. 90 Cap 3   • levothyroxine (SYNTHROID) 100 MCG Tab Take 1 Tab by mouth Every morning on an empty stomach. 90 Tab 3   • lovastatin (MEVACOR) 10 MG tablet Take 10 mg by mouth.  3   • oxybutynin SR (DITROPAN-XL) 5 MG TABLET SR 24 HR Take 10 mg by mouth.  0   • levothyroxine (SYNTHROID) 112 MCG Tab TAKE 1 TABLET BY MOUTH ONCE DAILY IN THE MORNING ON  AN  EMPTY  STOMACH 90 Tab 2  "  • Omega-3 Fatty Acids (FISH OIL) 1000 MG Cap capsule Take 1,000 mg by mouth 3 times a day, with meals.     • Red Yeast Rice Extract (RED YEAST RICE PO) Take  by mouth.     • vitamin D (CHOLECALCIFEROL) 1000 UNIT Tab Take 2,000 Units by mouth every day.     • B Complex-C (SUPER B COMPLEX PO) Take  by mouth.       No current Epic-ordered facility-administered medications on file.        Past Medical History:   Diagnosis Date   • Hyperlipidemia    • Hypertension    • Kidney disease, chronic, stage III (moderate, EGFR 30-59 ml/min) ([facility])    • Thyroid disease        Social History     Tobacco Use   • Smoking status: Former Smoker     Packs/day: 0.00     Last attempt to quit: 2000     Years since quittin.0   • Smokeless tobacco: Never Used   Substance Use Topics   • Alcohol use: Yes     Alcohol/week: 0.6 oz     Types: 1 Glasses of wine per week     Comment: rare   • Drug use: No       Family Status   Relation Name Status   • Mo             • Fa  Alive   • MGMo       Family History   Problem Relation Age of Onset   • Other Mother         Parkinson's   • Kidney Disease Father    • Hyperlipidemia Father    • Hypertension Father    • Kidney Disease Maternal Grandmother        ROS:  Review of Systems   Constitutional: Negative for fever, chills, weight loss and malaise/fatigue.   HENT: Negative for ear pain, nosebleeds, congestion, sore throat and neck pain.    Eyes: Negative for blurred vision.   Respiratory: Negative for cough, sputum production, shortness of breath and wheezing.    Cardiovascular: Negative for chest pain, palpitations, orthopnea and leg swelling.   Gastrointestinal: Negative for heartburn, nausea, vomiting and abdominal pain.   Genitourinary: Negative for dysuria, urgency and frequency.     Exam:  /66 (BP Location: Right arm, Patient Position: Sitting, BP Cuff Size: Adult)   Pulse 92   Temp 36.4 °C (97.5 °F) (Temporal)   Resp 14   Ht 1.676 m (5' 6\")   Wt 74.4 " kg (164 lb)   SpO2 98%   General:  Well nourished, well developed female in NAD  Head:Normocephalic, atraumatic  Eyes: PERRLA, EOM within normal limits, no conjunctival injection, no scleral icterus, visual fields and acuity grossly intact.  Nose: Symmetrical without tenderness, no discharge.  Mouth: reasonable hygiene, no erythema exudates or tonsillar enlargement.  Neck: no masses, range of motion within normal limits, no tracheal deviation. No obvious thyroid enlargement.  Extremities: no clubbing, cyanosis, or edema.  Right wrist is without any visual deformity, erythema, edema or ecchymosis.  She has good range of motion, good distal circulation and sensation.    Please note that this dictation was created using voice recognition software. I have made every reasonable attempt to correct obvious errors, but I expect that there are errors of grammar and possibly content that I did not discover before finalizing the note.    Assessment/Plan:  1. Contusion of right wrist, subsequent encounter       MMI

## 2020-04-06 ENCOUNTER — OFFICE VISIT (OUTPATIENT)
Dept: MEDICAL GROUP | Facility: MEDICAL CENTER | Age: 55
End: 2020-04-06
Payer: COMMERCIAL

## 2020-04-06 DIAGNOSIS — L50.9 HIVES: ICD-10-CM

## 2020-04-06 DIAGNOSIS — B02.9 HERPES ZOSTER WITHOUT COMPLICATION: ICD-10-CM

## 2020-04-06 PROCEDURE — 99214 OFFICE O/P EST MOD 30 MIN: CPT | Mod: CR,95 | Performed by: PHYSICIAN ASSISTANT

## 2020-04-06 RX ORDER — ACYCLOVIR 200 MG/1
400 CAPSULE ORAL
Qty: 50 CAP | Refills: 2 | Status: SHIPPED | OUTPATIENT
Start: 2020-04-06 | End: 2020-04-11

## 2020-04-06 NOTE — PROGRESS NOTES
Telemedicine Visit: Established Patient     This encounter was conducted via Zoom .   Verbal consent was obtained. Patient's identity was verified.    Subjective:   CC: hives, history of shingles  Susi Chatman is a 54 y.o. female presenting for evaluation and management of:  Shingles on left knee, recurrent, uses acyclovir prn  Recently getting itching rash/hives from head to toe. 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 Takes claritin daily for seasonal allergies. No illness. No fever/chills. No joint pain or redness.    ROS   Denies any recent fevers or chills. No nausea or vomiting. No chest pains or shortness of breath.     Allergies   Allergen Reactions   • Aspirin Hives   • Coconut Oil    • Peanut Oil        Current medicines (including changes today)  Current Outpatient Medications   Medication Sig Dispense Refill   • acyclovir (ZOVIRAX) 200 MG Cap Take 2 Caps by mouth 5 Times a Day for 5 days. 50 Cap 2   • estradiol (ESTRACE) 0.5 MG tablet Take 1 Tab by mouth every day. 90 Tab 3   • losartan (COZAAR) 25 MG Tab Take 2 Tabs by mouth every day. 90 Tab 3   • lovastatin (MEVACOR) 20 MG Tab Take 0.5 Tabs by mouth every evening. 90 Tab 3   • oxybutynin SR (DITROPAN-XL) 10 MG CR tablet Take 1 Tab by mouth every day. 90 Tab 3   • progesterone (PROMETRIUM) 200 MG capsule Take 1 Cap by mouth every day. 90 Cap 3   • levothyroxine (SYNTHROID) 100 MCG Tab Take 1 Tab by mouth Every morning on an empty stomach. 90 Tab 3   • lovastatin (MEVACOR) 10 MG tablet Take 10 mg by mouth.  3   • oxybutynin SR (DITROPAN-XL) 5 MG TABLET SR 24 HR Take 10 mg by mouth.  0   • levothyroxine (SYNTHROID) 112 MCG Tab TAKE 1 TABLET BY MOUTH ONCE DAILY IN THE MORNING ON  AN  EMPTY  STOMACH 90 Tab 2   • Omega-3 Fatty Acids (FISH OIL) 1000 MG Cap capsule Take 1,000 mg by mouth 3 times a day, with meals.     • Red Yeast Rice Extract (RED YEAST RICE PO) Take  by mouth.     • vitamin D  (CHOLECALCIFEROL) 1000 UNIT Tab Take 2,000 Units by mouth every day.     • B Complex-C (SUPER B COMPLEX PO) Take  by mouth.       No current facility-administered medications for this visit.        Patient Active Problem List    Diagnosis Date Noted   • Mixed stress and urge urinary incontinence 05/28/2019   • Essential hypertension 12/21/2018   • Mixed hyperlipidemia 12/21/2018   • Stage 3 chronic kidney disease (HCC) 12/21/2018   • Acquired hypothyroidism 12/21/2018   • Post-menopause 12/21/2018       Family History   Problem Relation Age of Onset   • Other Mother         Parkinson's   • Kidney Disease Father    • Hyperlipidemia Father    • Hypertension Father    • Kidney Disease Maternal Grandmother        She  has a past medical history of Hyperlipidemia, Hypertension, Kidney disease, chronic, stage III (moderate, EGFR 30-59 ml/min) ([facility]) (2011), and Thyroid disease.  She  has a past surgical history that includes abdominal subtotal hysterectomy; primary c section (1996); arthroscopy, knee (Bilateral, 2000, 2002); and tendon release foot.       Objective:   Physical Exam:  Constitutional: Alert, no distress, well-groomed.  Skin: No rashes in visible areas.  Psych: Alert and oriented x3, normal affect and mood.     Assessment and Plan:   The following treatment plan was discussed:     1. Herpes zoster without complication    2. Hives    Other orders  - acyclovir (ZOVIRAX) 200 MG Cap; Take 2 Caps by mouth 5 Times a Day for 5 days.  Dispense: 50 Cap; Refill: 2    Stress relieving techniques discussed, benadryl prn, f/u prn    Follow-up: as needed

## 2020-07-15 DIAGNOSIS — N18.30 STAGE 3 CHRONIC KIDNEY DISEASE: ICD-10-CM

## 2020-07-15 DIAGNOSIS — I10 ESSENTIAL HYPERTENSION: ICD-10-CM

## 2020-07-16 RX ORDER — LOSARTAN POTASSIUM 25 MG/1
TABLET ORAL
Qty: 180 TAB | Refills: 3 | Status: SHIPPED | OUTPATIENT
Start: 2020-07-16